# Patient Record
Sex: MALE
[De-identification: names, ages, dates, MRNs, and addresses within clinical notes are randomized per-mention and may not be internally consistent; named-entity substitution may affect disease eponyms.]

---

## 2018-03-11 ENCOUNTER — CHARTING TRANS (OUTPATIENT)
Dept: OTHER | Age: 53
End: 2018-03-11

## 2018-11-01 VITALS
HEART RATE: 60 BPM | DIASTOLIC BLOOD PRESSURE: 84 MMHG | TEMPERATURE: 98.7 F | SYSTOLIC BLOOD PRESSURE: 140 MMHG | RESPIRATION RATE: 16 BRPM

## 2021-01-06 ENCOUNTER — LAB ENCOUNTER (OUTPATIENT)
Dept: LAB | Age: 56
End: 2021-01-06
Attending: DERMATOLOGY
Payer: COMMERCIAL

## 2021-01-06 DIAGNOSIS — D48.5 NEOPLASM OF UNCERTAIN BEHAVIOR OF SKIN: ICD-10-CM

## 2021-01-06 PROCEDURE — 88342 IMHCHEM/IMCYTCHM 1ST ANTB: CPT

## 2021-01-06 PROCEDURE — 88341 IMHCHEM/IMCYTCHM EA ADD ANTB: CPT

## 2021-09-02 ENCOUNTER — TELEPHONE (OUTPATIENT)
Dept: ORTHOPEDICS CLINIC | Facility: CLINIC | Age: 56
End: 2021-09-02

## 2021-09-02 NOTE — TELEPHONE ENCOUNTER
Patient coming in for Bilateral 2+3 toe issues, as well as plantar warts on the left foot. Patient has developed black to blue pigmentation to the 2nd and 3rd foot. Patient has not had imaging, if imaging needed please place Rx.   Future Appointments

## 2021-09-02 NOTE — TELEPHONE ENCOUNTER
Please contact patient to schedule sooner appt due to patients black-blue foot discoloration, needs sooner appt    Hard to get him in sooner than next Thursday the 9th.  Can double book at 8 am.   JF

## 2021-09-02 NOTE — TELEPHONE ENCOUNTER
Future Appointments   Date Time Provider Zafar Khan   9/9/2021  8:00 AM Niru Miles, [facility]

## 2021-09-09 ENCOUNTER — OFFICE VISIT (OUTPATIENT)
Dept: ORTHOPEDICS CLINIC | Facility: CLINIC | Age: 56
End: 2021-09-09
Payer: COMMERCIAL

## 2021-09-09 VITALS — BODY MASS INDEX: 28.63 KG/M2 | HEIGHT: 70 IN | WEIGHT: 200 LBS

## 2021-09-09 DIAGNOSIS — S90.229A SUBUNGUAL HEMATOMA OF SECOND TOE: ICD-10-CM

## 2021-09-09 DIAGNOSIS — B07.0 PLANTAR WART: Primary | ICD-10-CM

## 2021-09-09 PROCEDURE — 99202 OFFICE O/P NEW SF 15 MIN: CPT | Performed by: PODIATRIST

## 2021-09-09 PROCEDURE — 3008F BODY MASS INDEX DOCD: CPT | Performed by: PODIATRIST

## 2021-09-09 RX ORDER — NAPROXEN 500 MG/1
500 TABLET ORAL
COMMUNITY
Start: 2021-08-06

## 2021-09-09 RX ORDER — ALUMINUM CHLORIDE 20 %
1 SOLUTION, NON-ORAL TOPICAL NIGHTLY
Qty: 1 EACH | Refills: 3 | Status: SHIPPED | OUTPATIENT
Start: 2021-09-09

## 2021-09-09 RX ORDER — LOSARTAN POTASSIUM 50 MG/1
50 TABLET ORAL DAILY
COMMUNITY
Start: 2021-07-27

## 2021-09-09 NOTE — PROGRESS NOTES
[facility] New Patient Note    CC: Patient presents with:  Warts: pt has wart on left foot  Toenail: pt has toenail problems after running downhill and hiking      HPI: The patient is a 60year old male who presents today with complaints of inj bid to rash 30 g 2       Pcn [Penicillins]       UNKNOWN  History reviewed. No pertinent family history.   Social History    Occupational History      Not on file    Tobacco Use      Smoking status: Never Smoker      Smokeless tobacco: Never Used    Lyndora